# Patient Record
Sex: FEMALE | Race: AMERICAN INDIAN OR ALASKA NATIVE
[De-identification: names, ages, dates, MRNs, and addresses within clinical notes are randomized per-mention and may not be internally consistent; named-entity substitution may affect disease eponyms.]

---

## 2018-02-27 ENCOUNTER — HOSPITAL ENCOUNTER (EMERGENCY)
Dept: HOSPITAL 5 - ED | Age: 28
Discharge: HOME | End: 2018-02-27
Payer: MEDICAID

## 2018-02-27 VITALS — DIASTOLIC BLOOD PRESSURE: 73 MMHG | SYSTOLIC BLOOD PRESSURE: 105 MMHG

## 2018-02-27 DIAGNOSIS — R11.2: ICD-10-CM

## 2018-02-27 DIAGNOSIS — Z90.49: ICD-10-CM

## 2018-02-27 DIAGNOSIS — J06.9: Primary | ICD-10-CM

## 2018-02-27 DIAGNOSIS — R19.7: ICD-10-CM

## 2018-02-27 LAB
BILIRUB UR QL STRIP: (no result)
BLOOD UR QL VISUAL: (no result)
MUCOUS THREADS #/AREA URNS HPF: (no result) /HPF
PH UR STRIP: 5 [PH] (ref 5–7)
PROT UR STRIP-MCNC: (no result) MG/DL
RBC #/AREA URNS HPF: 9 /HPF (ref 0–6)
UROBILINOGEN UR-MCNC: < 2 MG/DL (ref ?–2)
WBC #/AREA URNS HPF: 3 /HPF (ref 0–6)

## 2018-02-27 PROCEDURE — 99283 EMERGENCY DEPT VISIT LOW MDM: CPT

## 2018-02-27 PROCEDURE — 81001 URINALYSIS AUTO W/SCOPE: CPT

## 2018-02-27 NOTE — EMERGENCY DEPARTMENT REPORT
- General


Chief Complaint: Upper Respiratory Infection


Stated Complaint: NVD


Time Seen by Provider: 18 11:01


Source: patient


Mode of arrival: Ambulatory


Limitations: No Limitations





- History of Present Illness


Initial Comments: 





27-year-old -American female presents to the emergency room for flulike 

symptoms.  This has been going on for 3 days.  Patient complains of cough sore 

throat bilateral radial pain is a congestion and rhinorrhea nausea vomiting and 

back pain.  Patient recently had a baby back in 2017 with a  and 

tubal ligation.  Patient was has a past medical history of this C-sections and 

her gallbladder removed.  She has no known drug allergies and currently takes 

no medications.


MD Complaint: cough, sore throat, rhinorrhea, nasal congestion


-: days(s) (3)


Severity: moderate


Severity scale (0 -10): 7


Consistency: intermittent


Improves With: other (fever reducer)


Worsens With: nothing


Context: sick contacts


Associated Symptoms: rhinorrhea, nasal congestion, sore throat, cough, nausea, 

vomiting, diarrhea


Treatments Prior to Arrival: Acetaminophen





- Related Data


 Previous Rx's











 Medication  Instructions  Recorded  Last Taken  Type


 


Ondansetron [Zofran Odt] 4 mg PO Q8HR #9 tab.rapdis 18 Unknown Rx











 Allergies











Allergy/AdvReac Type Severity Reaction Status Date / Time


 


No Known Allergies Allergy   Verified 18 10:01














ED Review of Systems


ROS: 


Stated complaint: NVD


Other details as noted in HPI





Constitutional: denies: chills, fever


ENT: ear pain, throat pain, congestion


Respiratory: cough


Cardiovascular: denies: chest pain, palpitations


Endocrine: no symptoms reported


Musculoskeletal: back pain


Skin: denies: rash, lesions


Psychiatric: denies: anxiety, depression





ED Past Medical Hx





- Past Medical History


Previous Medical History?: No





- Surgical History


Past Surgical History?: Yes


Hx Cholecystectomy: Yes


Additional Surgical History: c section





- Social History


Smoking Status: Never Smoker


Substance Use Type: Alcohol





- Medications


Home Medications: 


 Home Medications











 Medication  Instructions  Recorded  Confirmed  Last Taken  Type


 


Ondansetron [Zofran Odt] 4 mg PO Q8HR #9 tab.rapdis 18  Unknown Rx














ED Physical Exam





- General


Limitations: No Limitations





- Head


Head exam: Present: atraumatic, normocephalic





- Eye


Eye exam: Present: normal appearance





- ENT


ENT exam: Present: mucous membranes moist





- Neck


Neck exam: Present: normal inspection





- Cardiovascular


Cardiovascular Exam: Present: regular rate, normal rhythm.  Absent: systolic 

murmur, diastolic murmur, rubs, gallop





- GI/Abdominal


GI/Abdominal exam: Present: soft, normal bowel sounds





- Extremities Exam


Extremities exam: Present: normal inspection





- Back Exam


Back exam: Present: normal inspection





- Psychiatric


Psychiatric exam: Present: normal affect, normal mood





- Skin


Skin exam: Present: warm, dry, intact, normal color.  Absent: rash





ED Course





 Vital Signs











  18





  10:01


 


Temperature 98.5 F


 


Pulse Rate 100 H


 


Respiratory 16





Rate 


 


Blood Pressure 105/73


 


O2 Sat by Pulse 99





Oximetry 














- Reevaluation(s)


Reevaluation #1: 





18 11:35


Reevaluated patient after having Zofran she reports she is feeling the best she'

s felt in a long time.  Discussed the patient I will give her an ibuprofen to 

help with her head and discharge her home patient verbalized understanding and 

very appreciative. 





ED Medical Decision Making





- Medical Decision Making





She's been evaluated by this provider fast track.  Give her Zofran 8 mg ODT.  I'

ll order ibuprofen for headache.  She does not have a fever so chest x-ray is 

not necessary.








27-year-old female presents with viral syndrome. 


Fever resolved no fever during the ED stay. 


Did not perform rapid flu test a ED due to patient fever resolved and prior to 

ED arrival. 


 Discussed with Pt symptomatic relief with over-the-counter medications. 


Discussed continue Motrin as needed for fever and pain. 


Discussed increase fluids and diet intake. 


Discussed rest much needed. 


Discussed daily vitamin C for immune booster. 


Discussed follow-up with PCP in 3-5 days. 


Patient verbally states she understands and will comply the following 

instructions and follow-up 


Vital signs stable. Patient is in no acute distress





Critical care attestation.: 


If time is entered above; I have spent that time in minutes in the direct care 

of this critically ill patient, excluding procedure time.








ED Disposition


Clinical Impression: 


 URI, acute





Disposition: DC-01 TO HOME OR SELFCARE


Is pt being admited?: No


Does the pt Need Aspirin: No


Condition: Stable


Instructions:  Viral Syndrome (ED)


Additional Instructions: 


You can take over-the-counter Tylenol or Motrin for body pains. 


Prescriptions: 


Ondansetron [Zofran Odt] 4 mg PO Q8HR #9 tab.rapdis


Referrals: 


PRIMARY CARE,MD [Primary Care Provider] - 3-5 Days


Forms:  Work/School Release Form(ED)